# Patient Record
Sex: MALE | Race: WHITE | Employment: FULL TIME | URBAN - METROPOLITAN AREA
[De-identification: names, ages, dates, MRNs, and addresses within clinical notes are randomized per-mention and may not be internally consistent; named-entity substitution may affect disease eponyms.]

---

## 2021-03-04 ENCOUNTER — TELEPHONE (OUTPATIENT)
Dept: GASTROENTEROLOGY | Facility: CLINIC | Age: 51
End: 2021-03-04

## 2021-03-04 NOTE — TELEPHONE ENCOUNTER
----- Message from Jennifer Dixon RN sent at 4/7/2016 10:59 AM CDT -----  Regarding: CLN recall  5 year CLN recall, per Dr. Esha Rosales; CLN done 4/4/16

## (undated) NOTE — LETTER
3/4/2021    Karina [address]. [phone]            Dear Karina Potts,      Our records indicate that you are due for an appointment for a Colonoscopy in April 2021, or shortly there after, with Zara Dandy, Wendy Roup